# Patient Record
Sex: FEMALE | Race: WHITE | NOT HISPANIC OR LATINO | Employment: UNEMPLOYED | URBAN - METROPOLITAN AREA
[De-identification: names, ages, dates, MRNs, and addresses within clinical notes are randomized per-mention and may not be internally consistent; named-entity substitution may affect disease eponyms.]

---

## 2022-11-04 ENCOUNTER — OFFICE VISIT (OUTPATIENT)
Dept: URGENT CARE | Facility: CLINIC | Age: 22
End: 2022-11-04

## 2022-11-04 VITALS
BODY MASS INDEX: 38.14 KG/M2 | RESPIRATION RATE: 16 BRPM | HEIGHT: 67 IN | TEMPERATURE: 99 F | DIASTOLIC BLOOD PRESSURE: 77 MMHG | HEART RATE: 84 BPM | OXYGEN SATURATION: 98 % | SYSTOLIC BLOOD PRESSURE: 111 MMHG | WEIGHT: 243 LBS

## 2022-11-04 DIAGNOSIS — J02.9 SORE THROAT: ICD-10-CM

## 2022-11-04 DIAGNOSIS — J03.00 ACUTE NON-RECURRENT STREPTOCOCCAL TONSILLITIS: Primary | ICD-10-CM

## 2022-11-04 LAB
CTP QC/QA: YES
S PYO RRNA THROAT QL PROBE: POSITIVE

## 2022-11-04 PROCEDURE — 99214 OFFICE O/P EST MOD 30 MIN: CPT | Mod: TIER,S$GLB,, | Performed by: NURSE PRACTITIONER

## 2022-11-04 PROCEDURE — 87880 POCT RAPID STREP A: ICD-10-PCS | Mod: QW,,, | Performed by: NURSE PRACTITIONER

## 2022-11-04 PROCEDURE — 87880 STREP A ASSAY W/OPTIC: CPT | Mod: QW,,, | Performed by: NURSE PRACTITIONER

## 2022-11-04 PROCEDURE — 99214 PR OFFICE/OUTPT VISIT, EST, LEVL IV, 30-39 MIN: ICD-10-PCS | Mod: TIER,S$GLB,, | Performed by: NURSE PRACTITIONER

## 2022-11-04 RX ORDER — AMOXICILLIN 500 MG/1
500 CAPSULE ORAL EVERY 12 HOURS
Qty: 20 CAPSULE | Refills: 0 | Status: SHIPPED | OUTPATIENT
Start: 2022-11-04 | End: 2022-11-14

## 2022-11-04 NOTE — PROGRESS NOTES
"Subjective:       Patient ID: Jesus Nicholson is a 22 y.o. female.    Vitals:  height is 5' 7" (1.702 m) and weight is 110.2 kg (243 lb). Her temperature is 98.9 °F (37.2 °C). Her blood pressure is 111/77 and her pulse is 84. Her respiration is 16 and oxygen saturation is 98%.     Chief Complaint: Sore Throat    Jesus Nicholson presents to clinic with sore throat, trouble swallowing, neck pain that has been present for the last 3 days.    Sore Throat   This is a new problem. The current episode started in the past 7 days (x's 3 days). The problem has been gradually worsening. Associated symptoms include neck pain and trouble swallowing. Associated symptoms comments: Stiffness to neck. She has tried acetaminophen for the symptoms. The treatment provided mild relief.     Constitution: Negative.   HENT:  Positive for sore throat and trouble swallowing.    Neck: Positive for neck pain.   Cardiovascular: Negative.    Eyes: Negative.    Respiratory: Negative.     Gastrointestinal: Negative.    Endocrine: negative.   Genitourinary: Negative.    Skin: Negative.      Objective:      Physical Exam   Constitutional: She is oriented to person, place, and time. She appears well-developed. She is cooperative.  Non-toxic appearance. She appears ill. No distress.   HENT:   Head: Normocephalic and atraumatic.   Ears:   Right Ear: Hearing, external ear and ear canal normal. A middle ear effusion is present.   Left Ear: Hearing, external ear and ear canal normal. A middle ear effusion is present.   Nose: Mucosal edema present. No rhinorrhea or nasal deformity. No epistaxis. Right sinus exhibits no maxillary sinus tenderness and no frontal sinus tenderness. Left sinus exhibits no maxillary sinus tenderness and no frontal sinus tenderness.   Mouth/Throat: Uvula is midline and mucous membranes are normal. No trismus in the jaw. Normal dentition. No uvula swelling. Posterior oropharyngeal erythema and cobblestoning present. No oropharyngeal " exudate or posterior oropharyngeal edema.   Eyes: Conjunctivae and lids are normal. No scleral icterus.   Neck: Trachea normal and phonation normal. Neck supple. No edema present. No erythema present. No neck rigidity present.   Cardiovascular: Normal rate, regular rhythm, normal heart sounds and normal pulses.   Pulmonary/Chest: Effort normal and breath sounds normal. No respiratory distress. She has no decreased breath sounds. She has no rhonchi.   Abdominal: Normal appearance.   Musculoskeletal: Normal range of motion.         General: No deformity. Normal range of motion.   Neurological: She is alert and oriented to person, place, and time. She exhibits normal muscle tone. Coordination normal.   Skin: Skin is warm, dry, intact, not diaphoretic and not pale.   Psychiatric: Her speech is normal and behavior is normal. Judgment and thought content normal.   Nursing note and vitals reviewed.      Assessment:       1. Acute non-recurrent streptococcal tonsillitis    2. Sore throat          Plan:         Acute non-recurrent streptococcal tonsillitis  -     amoxicillin (AMOXIL) 500 MG capsule; Take 1 capsule (500 mg total) by mouth every 12 (twelve) hours. for 10 days  Dispense: 20 capsule; Refill: 0    Sore throat  -     POCT rapid strep A  -     amoxicillin (AMOXIL) 500 MG capsule; Take 1 capsule (500 mg total) by mouth every 12 (twelve) hours. for 10 days  Dispense: 20 capsule; Refill: 0

## 2022-11-04 NOTE — LETTER
November 4, 2022      [facility] [place]  [address]  [place] [address]  Phone: [phone]  Fax: [phone]       Patient: Jesus Nicholson   YOB: 2000  Date of Visit: 11/04/2022    To Whom It May Concern:    Jesu Nicholson  was at [facility] on 11/04/2022. The patient may return to work/school on 11/6/22 with no restrictions. If you have any questions or concerns, or if I can be of further assistance, please do not hesitate to contact me.    Sincerely,    Vera Mcintyre NP

## 2022-12-02 ENCOUNTER — OFFICE VISIT (OUTPATIENT)
Dept: OBSTETRICS AND GYNECOLOGY | Facility: CLINIC | Age: 22
End: 2022-12-02
Payer: MEDICAID

## 2022-12-02 ENCOUNTER — LAB VISIT (OUTPATIENT)
Dept: LAB | Facility: CLINIC | Age: 22
End: 2022-12-02
Payer: MEDICAID

## 2022-12-02 VITALS
SYSTOLIC BLOOD PRESSURE: 116 MMHG | BODY MASS INDEX: 36.93 KG/M2 | HEART RATE: 81 BPM | WEIGHT: 249.31 LBS | DIASTOLIC BLOOD PRESSURE: 64 MMHG | HEIGHT: 69 IN

## 2022-12-02 DIAGNOSIS — Z98.891 HX OF CESAREAN SECTION: ICD-10-CM

## 2022-12-02 DIAGNOSIS — E06.3 HASHIMOTO'S DISEASE: ICD-10-CM

## 2022-12-02 DIAGNOSIS — R11.0 NAUSEA: ICD-10-CM

## 2022-12-02 DIAGNOSIS — O34.219 DESIRES VBAC (VAGINAL BIRTH AFTER CESAREAN) TRIAL: ICD-10-CM

## 2022-12-02 DIAGNOSIS — Z32.01 POSITIVE PREGNANCY TEST: Primary | ICD-10-CM

## 2022-12-02 LAB
T3 SERPL-MCNC: 104 NG/DL (ref 60–180)
T4 FREE SERPL-MCNC: 0.81 NG/DL (ref 0.71–1.51)
TSH SERPL DL<=0.005 MIU/L-ACNC: 8.46 UIU/ML (ref 0.4–4)

## 2022-12-02 PROCEDURE — 3078F DIAST BP <80 MM HG: CPT | Mod: CPTII,S$GLB,,

## 2022-12-02 PROCEDURE — 1159F PR MEDICATION LIST DOCUMENTED IN MEDICAL RECORD: ICD-10-PCS | Mod: CPTII,S$GLB,,

## 2022-12-02 PROCEDURE — 84480 ASSAY TRIIODOTHYRONINE (T3): CPT

## 2022-12-02 PROCEDURE — 3078F PR MOST RECENT DIASTOLIC BLOOD PRESSURE < 80 MM HG: ICD-10-PCS | Mod: CPTII,S$GLB,,

## 2022-12-02 PROCEDURE — 84439 ASSAY OF FREE THYROXINE: CPT

## 2022-12-02 PROCEDURE — 3074F PR MOST RECENT SYSTOLIC BLOOD PRESSURE < 130 MM HG: ICD-10-PCS | Mod: CPTII,S$GLB,,

## 2022-12-02 PROCEDURE — 3008F PR BODY MASS INDEX (BMI) DOCUMENTED: ICD-10-PCS | Mod: CPTII,S$GLB,,

## 2022-12-02 PROCEDURE — 84443 ASSAY THYROID STIM HORMONE: CPT

## 2022-12-02 PROCEDURE — 1159F MED LIST DOCD IN RCRD: CPT | Mod: CPTII,S$GLB,,

## 2022-12-02 PROCEDURE — 36415 PR COLLECTION VENOUS BLOOD,VENIPUNCTURE: ICD-10-PCS | Mod: ,,, | Performed by: STUDENT IN AN ORGANIZED HEALTH CARE EDUCATION/TRAINING PROGRAM

## 2022-12-02 PROCEDURE — 99203 OFFICE O/P NEW LOW 30 MIN: CPT | Mod: TH,S$GLB,,

## 2022-12-02 PROCEDURE — 3008F BODY MASS INDEX DOCD: CPT | Mod: CPTII,S$GLB,,

## 2022-12-02 PROCEDURE — 3074F SYST BP LT 130 MM HG: CPT | Mod: CPTII,S$GLB,,

## 2022-12-02 PROCEDURE — 36415 COLL VENOUS BLD VENIPUNCTURE: CPT | Mod: ,,, | Performed by: STUDENT IN AN ORGANIZED HEALTH CARE EDUCATION/TRAINING PROGRAM

## 2022-12-02 PROCEDURE — 99203 PR OFFICE/OUTPT VISIT, NEW, LEVL III, 30-44 MIN: ICD-10-PCS | Mod: TH,S$GLB,,

## 2022-12-02 RX ORDER — ONDANSETRON 4 MG/1
4 TABLET, ORALLY DISINTEGRATING ORAL EVERY 6 HOURS PRN
Qty: 30 TABLET | Refills: 5 | Status: SHIPPED | OUTPATIENT
Start: 2022-12-02 | End: 2023-03-02

## 2022-12-02 NOTE — PROGRESS NOTES
"2022  22 y.o. 7w5d per LMP of 10/09/2022. UPT in office is positive. Dating u/s ordered. Tentative JUSTIN is 2023.  OB History    Para Term  AB Living   3 1 1   1 1   SAB IAB Ectopic Multiple Live Births   1       1      # Outcome Date GA Lbr Daniel/2nd Weight Sex Delivery Anes PTL Lv   3 Current            2 SAB 2021     SAB      1 Term 2021    F CS-Unspec  N SAURABH      Complications: Breech presentation       Here for scheduled confirmation of pregnancy visit. Doing well.  No lof/brvb, dysuria, fever/chill. + nausea or emesis. No S&S of pre eclampsia or COIVD 19.  No cramps/regular contractions. Calm, pleasant, NAD. ROS negative with exception of aforementioned:    Allergies:   None    PMH:  Hashimoto's disease- not currently on medications. TSH, T4, T3 ordered today.  SAB complete in 2021    Surghx:  C/S 2021 for Breech. Baby Girl weighing 8#2oz. Pt desires .   [place]. aJs Guaman, VIOLETTE completed today for operative note  Tumor removal from Jaw  [place] teeth extraction    SOCHX:  Denies tobacco, substance or etoh use.  She desires NIPT at next appt.     Review of Systems:  General ROS: negative for headache or visual changes  Breast ROS: negative for breast lumps  Gastrointestinal ROS: negative for constipation, diarrhea. + nausea/vomiting  Musculoskeletal ROS: negative for pain in joints or swelling in face or hands.   Neurological ROS: negative for - headaches, numbness/tingling or visual changes      Physical Exam:  /64 (BP Location: Left arm, Patient Position: Sitting)   Pulse 81   Ht 5' 9" (1.753 m)   Wt 113.1 kg (249 lb 4.8 oz)   LMP 10/09/2022   Breastfeeding Unknown   BMI 36.82 kg/m²   UPT: positive  FHT:  not assessed due to early gestation    Constitutional: She is oriented to person, place, and time. She appears well-developed and well-nourished. No distress.   Pulmonary/Chest: Effort normal. No respiratory distress  Abdominal: Soft, " gravid, nontender. No rebound and no guarding. Fundal Height:  not assessed due to early gestation  Genitourinary: Deferred   Musculoskeletal: Normal range of motion. Minimal peripheral edema.   Neurological: She is alert and oriented to person, place, and time. Coordination normal. Gait smooth and steady  Skin: Skin is warm and dry. She is not diaphoretic.  Psychiatric: She has a normal mood and affect.      Assessment:   22 y.o., at Unknown Gestation   Patient Active Problem List   Diagnosis    Hashimoto's disease    Hx of  section    Desires  (vaginal birth after ) trial     No current outpatient medications on file prior to visit.     No current facility-administered medications on file prior to visit.       Plan:  S&S of SAB reinforced.  Continue home meds/daily PNV, antiemetics.  Hx of hashimotos, thyroid panel completed today.  Dating u/s ordered.  Hx of C/S for breech. Operative note requested. Desires .  LAURA in 4 weeks.

## 2022-12-05 DIAGNOSIS — E06.3 HASHIMOTO'S DISEASE: Primary | ICD-10-CM

## 2022-12-05 RX ORDER — LEVOTHYROXINE SODIUM 50 UG/1
50 TABLET ORAL
Qty: 30 TABLET | Refills: 1 | Status: SHIPPED | OUTPATIENT
Start: 2022-12-05 | End: 2023-01-03

## 2022-12-05 NOTE — PROGRESS NOTES
TSH is high. I am starting her on 50 mcg of synthroid. She needs to take this 1st thing in the morning prior to eating. She has to space medication and food by 1 hr and synthroid and PNV 4 hrs. Will recheck TSH in 4 weeks.

## 2022-12-05 NOTE — PROGRESS NOTES
Pt notified of rx for synthroid and TSH results. Pt picked up synthroid this morning and took before breakfast. She verbalizes understanding of instructions for taking medication.

## 2022-12-13 ENCOUNTER — PATIENT MESSAGE (OUTPATIENT)
Dept: OBSTETRICS AND GYNECOLOGY | Facility: CLINIC | Age: 22
End: 2022-12-13

## 2022-12-16 ENCOUNTER — PROCEDURE VISIT (OUTPATIENT)
Dept: MATERNAL FETAL MEDICINE | Facility: CLINIC | Age: 22
End: 2022-12-16
Payer: MEDICAID

## 2022-12-16 DIAGNOSIS — Z32.01 POSITIVE PREGNANCY TEST: ICD-10-CM

## 2022-12-16 PROCEDURE — 76801 OB US < 14 WKS SINGLE FETUS: CPT | Mod: S$GLB,,, | Performed by: OBSTETRICS & GYNECOLOGY

## 2022-12-16 PROCEDURE — 76801 US OB/GYN PROCEDURE (VIEWPOINT): ICD-10-PCS | Mod: S$GLB,,, | Performed by: OBSTETRICS & GYNECOLOGY

## 2022-12-20 DIAGNOSIS — K59.09 OTHER CONSTIPATION: Primary | ICD-10-CM

## 2022-12-20 DIAGNOSIS — K64.9 HEMORRHOIDS, UNSPECIFIED HEMORRHOID TYPE: ICD-10-CM

## 2022-12-20 RX ORDER — POLYETHYLENE GLYCOL 3350 17 G/17G
17 POWDER, FOR SOLUTION ORAL DAILY
Qty: 595 G | Refills: 0 | Status: SHIPPED | OUTPATIENT
Start: 2022-12-20 | End: 2023-01-19

## 2022-12-20 RX ORDER — HYDROCORTISONE 1 %
CREAM (GRAM) TOPICAL
Qty: 30 G | Refills: 1 | Status: SHIPPED | OUTPATIENT
Start: 2022-12-20 | End: 2023-12-20

## 2022-12-28 ENCOUNTER — LAB VISIT (OUTPATIENT)
Dept: LAB | Facility: CLINIC | Age: 22
End: 2022-12-28
Payer: MEDICAID

## 2022-12-28 ENCOUNTER — ROUTINE PRENATAL (OUTPATIENT)
Dept: OBSTETRICS AND GYNECOLOGY | Facility: CLINIC | Age: 22
End: 2022-12-28
Payer: MEDICAID

## 2022-12-28 VITALS
BODY MASS INDEX: 35.9 KG/M2 | HEART RATE: 87 BPM | WEIGHT: 243.13 LBS | SYSTOLIC BLOOD PRESSURE: 112 MMHG | DIASTOLIC BLOOD PRESSURE: 68 MMHG

## 2022-12-28 DIAGNOSIS — K64.9 HEMORRHOIDS, UNSPECIFIED HEMORRHOID TYPE: ICD-10-CM

## 2022-12-28 DIAGNOSIS — Z3A.11 11 WEEKS GESTATION OF PREGNANCY: Primary | ICD-10-CM

## 2022-12-28 DIAGNOSIS — O34.219 DESIRES VBAC (VAGINAL BIRTH AFTER CESAREAN) TRIAL: ICD-10-CM

## 2022-12-28 DIAGNOSIS — Z3A.11 11 WEEKS GESTATION OF PREGNANCY: ICD-10-CM

## 2022-12-28 DIAGNOSIS — Z98.891 HX OF CESAREAN SECTION: ICD-10-CM

## 2022-12-28 DIAGNOSIS — E06.3 HASHIMOTO'S DISEASE: ICD-10-CM

## 2022-12-28 LAB
ABO + RH BLD: NORMAL
BASOPHILS # BLD AUTO: 0.03 K/UL (ref 0–0.2)
BASOPHILS NFR BLD: 0.4 % (ref 0–1.9)
BLD GP AB SCN CELLS X3 SERPL QL: NORMAL
DIFFERENTIAL METHOD: ABNORMAL
EOSINOPHIL # BLD AUTO: 0 K/UL (ref 0–0.5)
EOSINOPHIL NFR BLD: 0.6 % (ref 0–8)
ERYTHROCYTE [DISTWIDTH] IN BLOOD BY AUTOMATED COUNT: 15.1 % (ref 11.5–14.5)
HAV IGM SERPL QL IA: ABNORMAL
HBV CORE IGM SERPL QL IA: ABNORMAL
HBV SURFACE AG SERPL QL IA: ABNORMAL
HCT VFR BLD AUTO: 34.8 % (ref 37–48.5)
HCV AB SERPL QL IA: REACTIVE
HGB BLD-MCNC: 11.6 G/DL (ref 12–16)
HGB S BLD QL SOLY: NEGATIVE
HIV 1+2 AB+HIV1 P24 AG SERPL QL IA: NORMAL
IMM GRANULOCYTES # BLD AUTO: 0.02 K/UL (ref 0–0.04)
IMM GRANULOCYTES NFR BLD AUTO: 0.3 % (ref 0–0.5)
LYMPHOCYTES # BLD AUTO: 1.5 K/UL (ref 1–4.8)
LYMPHOCYTES NFR BLD: 21.3 % (ref 18–48)
MCH RBC QN AUTO: 28.1 PG (ref 27–31)
MCHC RBC AUTO-ENTMCNC: 33.3 G/DL (ref 32–36)
MCV RBC AUTO: 84 FL (ref 82–98)
MONOCYTES # BLD AUTO: 0.4 K/UL (ref 0.3–1)
MONOCYTES NFR BLD: 5.6 % (ref 4–15)
NEUTROPHILS # BLD AUTO: 5 K/UL (ref 1.8–7.7)
NEUTROPHILS NFR BLD: 71.8 % (ref 38–73)
NRBC BLD-RTO: 0 /100 WBC
PLATELET # BLD AUTO: 203 K/UL (ref 150–450)
PMV BLD AUTO: 13.3 FL (ref 9.2–12.9)
RBC # BLD AUTO: 4.13 M/UL (ref 4–5.4)
T3 SERPL-MCNC: 108 NG/DL (ref 60–180)
T4 FREE SERPL-MCNC: 0.97 NG/DL (ref 0.71–1.51)
TSH SERPL DL<=0.005 MIU/L-ACNC: 2.96 UIU/ML (ref 0.4–4)
WBC # BLD AUTO: 6.99 K/UL (ref 3.9–12.7)

## 2022-12-28 PROCEDURE — 36415 COLL VENOUS BLD VENIPUNCTURE: CPT | Mod: ,,, | Performed by: STUDENT IN AN ORGANIZED HEALTH CARE EDUCATION/TRAINING PROGRAM

## 2022-12-28 PROCEDURE — 85025 COMPLETE CBC W/AUTO DIFF WBC: CPT

## 2022-12-28 PROCEDURE — 86592 SYPHILIS TEST NON-TREP QUAL: CPT

## 2022-12-28 PROCEDURE — 84443 ASSAY THYROID STIM HORMONE: CPT

## 2022-12-28 PROCEDURE — 36415 PR COLLECTION VENOUS BLOOD,VENIPUNCTURE: ICD-10-PCS | Mod: ,,, | Performed by: STUDENT IN AN ORGANIZED HEALTH CARE EDUCATION/TRAINING PROGRAM

## 2022-12-28 PROCEDURE — 86762 RUBELLA ANTIBODY: CPT

## 2022-12-28 PROCEDURE — 99214 OFFICE O/P EST MOD 30 MIN: CPT | Mod: TH,S$GLB,,

## 2022-12-28 PROCEDURE — 84439 ASSAY OF FREE THYROXINE: CPT

## 2022-12-28 PROCEDURE — 80074 ACUTE HEPATITIS PANEL: CPT

## 2022-12-28 PROCEDURE — 99214 PR OFFICE/OUTPT VISIT, EST, LEVL IV, 30-39 MIN: ICD-10-PCS | Mod: TH,S$GLB,,

## 2022-12-28 PROCEDURE — 87389 HIV-1 AG W/HIV-1&-2 AB AG IA: CPT

## 2022-12-28 PROCEDURE — 85660 RBC SICKLE CELL TEST: CPT

## 2022-12-28 PROCEDURE — 84480 ASSAY TRIIODOTHYRONINE (T3): CPT

## 2022-12-28 PROCEDURE — 86900 BLOOD TYPING SEROLOGIC ABO: CPT

## 2022-12-28 NOTE — PROGRESS NOTES
2022  22 y.o. 11w5d per u/s on 2022 at 10w0d.  JUSTIN is 2023.  OB History    Para Term  AB Living   3 1 1   1 1   SAB IAB Ectopic Multiple Live Births   1       1      # Outcome Date GA Lbr Daniel/2nd Weight Sex Delivery Anes PTL Lv   3 Current            2 SAB 2021     SAB      1 Term 2021   3.685 kg (8 lb 2 oz) F CS-LTranv  N SAURABH      Complications: Breech presentation       Here for Initial OB visit. Doing well.  No lof/brvb, dysuria, fever/chill, nausea/vomiting. No S&S of pre eclampsia or COIVD 19.  No cramps/regular contractions. Calm, pleasant, NAD. ROS negative with exception of aforementioned:    Operative not for  c/s received and reviewed. LTCS per operative note. Op note scanned to media. Pt desires . Will schedule  consult after 20 weeks EGA. We discussed that [place] [facility] [place]. Pt verbalizes understanding.     Allergies:   None    PMH:  Hashimoto's disease- not currently on medications. Last TSH 8. Started on 50mcg syntrhoid. Thyroid panel repeated today.   SAB complete in 2021    Surghx:  C/S 2021 for Breech. Baby Girl weighing 8#2oz. Pt desires .   [place]. 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  She desires NIPT at next appt.     OBHX:    Hx of C/S for Breech. Desires .  Hashimotos  BMI > 35 at Initial OB    LABS:    Thyroid Panel  TSH 8.445  T4 0.81  T3 104    Review of Systems:  General ROS: negative for headache or visual changes  Breast ROS: negative for breast lumps  Gastrointestinal ROS: negative for constipation, diarrhea. + nausea/vomiting  Musculoskeletal ROS: negative for pain in joints or swelling in face or hands.   Neurological ROS: negative for - headaches, numbness/tingling or visual changes      Physical Exam:  /68   Pulse 87   Wt 110.3 kg (243 lb 1.6 oz)   LMP  10/09/2022   BMI 35.90 kg/m²   Urine Dip: neg/neg  FHT: Fetal Heart Rate: 160    Constitutional: She is oriented to person, place, and time. She appears well-developed and well-nourished. No distress.   Pulmonary/Chest: Effort normal. No respiratory distress  Abdominal: Soft, gravid, nontender. No rebound and no guarding. Fundal Height: Fundal Height (cm): 11 cm S=D  Genitourinary: Deferred   Musculoskeletal: Normal range of motion. Minimal peripheral edema.   Neurological: She is alert and oriented to person, place, and time. Coordination normal. Gait smooth and steady  Skin: Skin is warm and dry. She is not diaphoretic.  Psychiatric: She has a normal mood and affect.      Assessment:   22 y.o., at 11w5d  Patient Active Problem List   Diagnosis    Hashimoto's disease    Hx of  section    Desires  (vaginal birth after ) trial    11 weeks gestation of pregnancy     Current Outpatient Medications on File Prior to Visit   Medication Sig Dispense Refill    hydrocortisone 1 % cream Apply to affected area 2 times daily 30 g 1    levothyroxine (SYNTHROID) 50 MCG tablet Take 1 tablet (50 mcg total) by mouth before breakfast. 30 tablet 1    ondansetron (ZOFRAN-ODT) 4 MG TbDL Take 1 tablet (4 mg total) by mouth every 6 (six) hours as needed (nausea). 30 tablet 5    polyethylene glycol (GLYCOLAX) 17 gram/dose powder Take 17 g by mouth once daily. 595 g 0     No current facility-administered medications on file prior to visit.       Plan:  Oriented to our collaborative group.   S&S of SAB reinforced.  Continue home meds/daily PNV, antiemetics, synthroid.  TSH 8.445 last visit. Started on 50mcg synthoid. Thyroid panel repeated today.  JUSTIN 2023 per u/s at 10w0d.  Hx of C/S for breech. Operative note states LTCS. Desires .  OB panel + Thyroid panel + NIPT today.  LAURA in 4 weeks.

## 2022-12-29 LAB
RPR SER QL: NORMAL
RUBV IGG SER-ACNC: 14.4 IU/ML
RUBV IGG SER-IMP: REACTIVE

## 2022-12-30 DIAGNOSIS — R76.8 HEPATITIS C ANTIBODY POSITIVE IN BLOOD: Primary | ICD-10-CM

## 2022-12-30 NOTE — PROGRESS NOTES
+ hep c ab reviewed with patient via phone call. Pt is not sure how she may have come into contact with hep c virus. HCV qualitative and quantitative testing recommended. Pt states she can come in on Monday for testing.

## 2023-01-03 ENCOUNTER — LAB VISIT (OUTPATIENT)
Dept: LAB | Facility: CLINIC | Age: 23
End: 2023-01-03
Payer: MEDICAID

## 2023-01-03 DIAGNOSIS — R76.8 HEPATITIS C ANTIBODY POSITIVE IN BLOOD: ICD-10-CM

## 2023-01-03 PROCEDURE — 87522 HEPATITIS C REVRS TRNSCRPJ: CPT | Mod: 59

## 2023-01-03 PROCEDURE — 36415 COLL VENOUS BLD VENIPUNCTURE: CPT | Mod: ,,, | Performed by: STUDENT IN AN ORGANIZED HEALTH CARE EDUCATION/TRAINING PROGRAM

## 2023-01-03 PROCEDURE — 87521 HEPATITIS C PROBE&RVRS TRNSC: CPT

## 2023-01-03 PROCEDURE — 36415 PR COLLECTION VENOUS BLOOD,VENIPUNCTURE: ICD-10-PCS | Mod: ,,, | Performed by: STUDENT IN AN ORGANIZED HEALTH CARE EDUCATION/TRAINING PROGRAM

## 2023-01-04 LAB
HCV RNA SERPL QL NAA+PROBE: NOT DETECTED
HCV RNA SPEC NAA+PROBE-ACNC: <12 IU/ML

## 2023-01-10 LAB — HCV RNA SERPL QL NAA+PROBE: NOT DETECTED

## 2023-01-25 ENCOUNTER — LAB VISIT (OUTPATIENT)
Dept: LAB | Facility: CLINIC | Age: 23
End: 2023-01-25
Payer: MEDICAID

## 2023-01-25 ENCOUNTER — ROUTINE PRENATAL (OUTPATIENT)
Dept: OBSTETRICS AND GYNECOLOGY | Facility: CLINIC | Age: 23
End: 2023-01-25
Payer: MEDICAID

## 2023-01-25 VITALS
HEART RATE: 94 BPM | DIASTOLIC BLOOD PRESSURE: 76 MMHG | SYSTOLIC BLOOD PRESSURE: 118 MMHG | WEIGHT: 244 LBS | BODY MASS INDEX: 36.03 KG/M2

## 2023-01-25 DIAGNOSIS — Z98.891 HX OF CESAREAN SECTION: ICD-10-CM

## 2023-01-25 DIAGNOSIS — R76.8 HEPATITIS C ANTIBODY POSITIVE IN BLOOD: ICD-10-CM

## 2023-01-25 DIAGNOSIS — Z3A.15 15 WEEKS GESTATION OF PREGNANCY: ICD-10-CM

## 2023-01-25 DIAGNOSIS — E06.3 HASHIMOTO'S DISEASE: Primary | ICD-10-CM

## 2023-01-25 DIAGNOSIS — O34.219 DESIRES VBAC (VAGINAL BIRTH AFTER CESAREAN) TRIAL: ICD-10-CM

## 2023-01-25 DIAGNOSIS — E06.3 HASHIMOTO'S DISEASE: ICD-10-CM

## 2023-01-25 DIAGNOSIS — E66.9 OBESITY (BMI 35.0-39.9 WITHOUT COMORBIDITY): ICD-10-CM

## 2023-01-25 PROBLEM — Z3A.11 11 WEEKS GESTATION OF PREGNANCY: Status: RESOLVED | Noted: 2022-12-28 | Resolved: 2023-01-25

## 2023-01-25 PROCEDURE — 99213 PR OFFICE/OUTPT VISIT, EST, LEVL III, 20-29 MIN: ICD-10-PCS | Mod: TH,S$GLB,, | Performed by: ADVANCED PRACTICE MIDWIFE

## 2023-01-25 PROCEDURE — 99213 OFFICE O/P EST LOW 20 MIN: CPT | Mod: TH,S$GLB,, | Performed by: ADVANCED PRACTICE MIDWIFE

## 2023-01-25 PROCEDURE — 84480 ASSAY TRIIODOTHYRONINE (T3): CPT | Performed by: ADVANCED PRACTICE MIDWIFE

## 2023-01-25 PROCEDURE — 80307 DRUG TEST PRSMV CHEM ANLYZR: CPT | Performed by: ADVANCED PRACTICE MIDWIFE

## 2023-01-25 PROCEDURE — 82105 ALPHA-FETOPROTEIN SERUM: CPT | Performed by: ADVANCED PRACTICE MIDWIFE

## 2023-01-25 NOTE — PROGRESS NOTES
2023  22 y.o.  at 15 + 5d per u/s on 2022 at 10w0d.  JUSTIN is 2023.  OB History    Para Term  AB Living   3 1 1   1 1   SAB IAB Ectopic Multiple Live Births   1       1      # Outcome Date GA Lbr Daniel/2nd Weight Sex Delivery Anes PTL Lv   3 Current            2 SAB 2021     SAB      1 Term 2021   3.685 kg (8 lb 2 oz) F CS-LTranv  N SAURABH      Complications: Breech presentation       Here for Initial OB visit. Doing well.  No lof/brvb, dysuria, fever/chils, N&V. No abdominal pain/cramps. No quickening as of yet. Calm, pleasant, NAD. ROS negative with exception of aforementioned:    Operative not for  c/s received and reviewed. LTCS per operative note (breech). Op note scanned to media. Pt desires . Will schedule  consult after 20 weeks EGA.  discussed that VBACs are done at [facility] with physician. Pt verbalizes understanding.   + Hep ab with neg quant and qual- plan repeat with 28 week labs.     LABS:  A pos abs neg  HIV neg  Rubella immune  RPR non reactive  HEP A B neg  + Hep C ab with neg quant and qual  UDS pending  GC CHL pending  Urine cx pending  NIPT low risk x 3 female  CBC    Sickle Screen neg  AFP pending    Allergies:   None    PMH:  Hashimoto's disease- not currently on medications. Last TSH 8. Started on 50mcg syntrhoid. Thyroid panel repeated and decreased to 2. Repeat today.  SAB complete in 2021    Surghx:  C/S 2021 for Breech. Baby Girl weighing 8#2oz. Pt desires .   [place]. 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  She desires NIPT at next appt.     OBHX:    Hx of C/S for Breech. Desires .  Hashimotos  BMI > 35 at Initial OB    LABS:    Thyroid Panel  TSH 8.445  T4 0.81  T3 104    Review of Systems:  General ROS: negative for headache or visual changes  Breast ROS: negative for breast  lumps  Gastrointestinal ROS: negative for constipation, diarrhea. + nausea/vomiting  Musculoskeletal ROS: negative for pain in joints or swelling in face or hands.   Neurological ROS: negative for - headaches, numbness/tingling or visual changes      Physical Exam:  /76   Pulse 94   Wt 110.7 kg (244 lb)   LMP 10/09/2022   BMI 36.03 kg/m²   FHT:  150s    Constitutional: She is oriented to person, place, and time. She appears well-developed and well-nourished. No distress.   Pulmonary/Chest: Effort normal. No respiratory distress  Abdominal: Soft, gravid, nontender. No rebound and no guarding. Fundal Height:   S=D  Genitourinary: Deferred   Musculoskeletal: Normal range of motion. Minimal peripheral edema.   Neurological: She is alert and oriented to person, place, and time. Coordination normal. Gait smooth and steady  Skin: Skin is warm and dry. She is not diaphoretic.  Psychiatric: She has a normal mood and affect.      Assessment:   22 y.o., at 15 w5d  Patient Active Problem List   Diagnosis    Hashimoto's disease    Hx of  section    Desires  (vaginal birth after ) trial    15 weeks gestation of pregnancy    Hepatitis C antibody positive in blood    Obesity (BMI 35.0-39.9 without comorbidity)     Current Outpatient Medications on File Prior to Visit   Medication Sig Dispense Refill    hydrocortisone 1 % cream Apply to affected area 2 times daily 30 g 1    levothyroxine (SYNTHROID) 50 MCG tablet TAKE 1 TABLET(50 MCG) BY MOUTH BEFORE BREAKFAST 30 tablet 1    ondansetron (ZOFRAN-ODT) 4 MG TbDL Take 1 tablet (4 mg total) by mouth every 6 (six) hours as needed (nausea). 30 tablet 5     No current facility-administered medications on file prior to visit.       Plan:  1. Oriented to our collaborative group.   2. S&S of SAB reinforced.  3. Continue home meds/daily PNV, antiemetics, synthroid.  4. JUSTIN 2023 per u/s at 10w0d.  5. Hx of C/S for breech. Operative note states LTCS.  Desires . Plan MD consult later in pregnancy  6. OB panel labs reviewed and discused. AFP, TSH, T3, T4 today. UDS. Urine cx and GC CHL as not done previously.  7. Plan repeat HEP Panel at 28 weeks due to + Hep C ab with neg quant and qual.  8. Anatomy US and LAURA in 4 weeks. US ordered.   9. F/U with provider of choice.

## 2023-01-26 LAB
AMPHET+METHAMPHET UR QL: NEGATIVE
BARBITURATES UR QL SCN>200 NG/ML: NEGATIVE
BENZODIAZ UR QL SCN>200 NG/ML: NEGATIVE
BZE UR QL SCN: NEGATIVE
CANNABINOIDS UR QL SCN: NEGATIVE
CREAT UR-MCNC: 220.7 MG/DL (ref 15–325)
METHADONE UR QL SCN>300 NG/ML: NEGATIVE
OPIATES UR QL SCN: NEGATIVE
PCP UR QL SCN>25 NG/ML: NEGATIVE
T3 SERPL-MCNC: 137 NG/DL (ref 60–180)
T4 FREE SERPL-MCNC: 0.92 NG/DL (ref 0.71–1.51)
TOXICOLOGY INFORMATION: NORMAL
TSH SERPL DL<=0.005 MIU/L-ACNC: 2.46 UIU/ML (ref 0.4–4)

## 2023-01-26 PROCEDURE — 36415 COLL VENOUS BLD VENIPUNCTURE: CPT | Mod: ,,, | Performed by: STUDENT IN AN ORGANIZED HEALTH CARE EDUCATION/TRAINING PROGRAM

## 2023-01-26 PROCEDURE — 36415 PR COLLECTION VENOUS BLOOD,VENIPUNCTURE: ICD-10-PCS | Mod: ,,, | Performed by: STUDENT IN AN ORGANIZED HEALTH CARE EDUCATION/TRAINING PROGRAM

## 2023-01-27 ENCOUNTER — PATIENT MESSAGE (OUTPATIENT)
Dept: OTHER | Facility: OTHER | Age: 23
End: 2023-01-27
Payer: MEDICAID

## 2023-01-27 LAB — BACTERIA UR CULT: NORMAL

## 2023-01-29 LAB
C TRACH DNA SPEC QL NAA+PROBE: NOT DETECTED
N GONORRHOEA DNA SPEC QL NAA+PROBE: NOT DETECTED

## 2023-01-30 LAB
# FETUSES US: NORMAL
AFP INTERPRETATION: NORMAL
AFP MOM SERPL: 0.8
AFP SERPL-MCNC: 20.1 NG/ML
AFP SERPL-MCNC: NEGATIVE NG/ML
AGE AT DELIVERY: 22
GA (DAYS): 6 D
GA (WEEKS): 15 WK
GESTATIONAL AGE METHOD: NORMAL
IDDM PATIENT QL: NORMAL
SMOKING STATUS FTND: NO

## 2023-02-03 ENCOUNTER — PATIENT MESSAGE (OUTPATIENT)
Dept: OTHER | Facility: OTHER | Age: 23
End: 2023-02-03
Payer: MEDICAID

## 2023-02-22 ENCOUNTER — PROCEDURE VISIT (OUTPATIENT)
Dept: MATERNAL FETAL MEDICINE | Facility: CLINIC | Age: 23
End: 2023-02-22
Payer: MEDICAID

## 2023-02-22 ENCOUNTER — ROUTINE PRENATAL (OUTPATIENT)
Dept: OBSTETRICS AND GYNECOLOGY | Facility: CLINIC | Age: 23
End: 2023-02-22
Payer: MEDICAID

## 2023-02-22 VITALS
BODY MASS INDEX: 36.67 KG/M2 | WEIGHT: 248.31 LBS | HEART RATE: 80 BPM | DIASTOLIC BLOOD PRESSURE: 70 MMHG | SYSTOLIC BLOOD PRESSURE: 109 MMHG

## 2023-02-22 DIAGNOSIS — Z3A.19 19 WEEKS GESTATION OF PREGNANCY: Primary | ICD-10-CM

## 2023-02-22 DIAGNOSIS — R76.8 HEPATITIS C ANTIBODY POSITIVE IN BLOOD: ICD-10-CM

## 2023-02-22 DIAGNOSIS — Z98.891 HX OF CESAREAN SECTION: ICD-10-CM

## 2023-02-22 DIAGNOSIS — O34.219 DESIRES VBAC (VAGINAL BIRTH AFTER CESAREAN) TRIAL: ICD-10-CM

## 2023-02-22 DIAGNOSIS — E66.9 OBESITY (BMI 35.0-39.9 WITHOUT COMORBIDITY): ICD-10-CM

## 2023-02-22 DIAGNOSIS — Z3A.15 15 WEEKS GESTATION OF PREGNANCY: ICD-10-CM

## 2023-02-22 PROCEDURE — 76811 OB US DETAILED SNGL FETUS: CPT | Mod: S$GLB,,, | Performed by: OBSTETRICS & GYNECOLOGY

## 2023-02-22 PROCEDURE — 99214 OFFICE O/P EST MOD 30 MIN: CPT | Mod: TH,S$GLB,, | Performed by: ADVANCED PRACTICE MIDWIFE

## 2023-02-22 PROCEDURE — 99214 PR OFFICE/OUTPT VISIT, EST, LEVL IV, 30-39 MIN: ICD-10-PCS | Mod: TH,S$GLB,, | Performed by: ADVANCED PRACTICE MIDWIFE

## 2023-02-22 PROCEDURE — 76811 US OB/GYN PROCEDURE (VIEWPOINT): ICD-10-PCS | Mod: S$GLB,,, | Performed by: OBSTETRICS & GYNECOLOGY

## 2023-02-22 NOTE — PROGRESS NOTES
2023  22 y.o.  at 19 + 5d per u/s on 2022 at 10w0d.  JUSTIN is 2023.  OB History    Para Term  AB Living   3 1 1   1 1   SAB IAB Ectopic Multiple Live Births   1       1      # Outcome Date GA Lbr Daniel/2nd Weight Sex Delivery Anes PTL Lv   3 Current            2 SAB 2021     SAB      1 Term 2021   3.685 kg (8 lb 2 oz) F CS-LTranv  N SAURABH      Complications: Breech presentation       Here for Initial OB visit. Doing well.  No lof/brvb, dysuria, fever/chils, N&V. No abdominal pain/cramps. + quickening reported. Rare nausea. Calm, pleasant, NAD. ROS negative with exception of aforementioned:    Operative not for  c/s received and reviewed. LTCS per operative note (breech). Op note scanned to media. Pt desires . Will schedule  consult after 20 weeks EGA.  discussed that VBACs are done at [facility] with physician. Pt verbalizes understanding.   + Hep ab with neg quant and qual- plan repeat with 28 week labs. Hashimoto's disease-  TSH 8. Started on 50mcg syntrhoid. Thyroid panel repeated and decreased to 2.9 Repeat TSH on 23 = 2.46. wnl      LABS:  A pos abs neg  HIV neg  Rubella immune  RPR non reactive  HEP A B neg  + Hep C ab with neg quant and qual  UDS neg  GC CHL  neg  Urine cx no growth  NIPT low risk x 3 female  CBC    Sickle Screen neg  AFP neg    Allergies:   None    PMH:  Hashimoto's disease- TSH 8. Started on 50mcg syntrhoid. Thyroid panel repeated and decreased to 2.9 Repeat TSHon 23 = 2.46. wnl  SAB complete in 2021    Surghx:  C/S 2021 for Breech. Baby Girl weighing 8#2oz. Pt desires .   [place]. Jas Guaman, VIOLETTE completed today for operative note  Tumor removal from Jaw  [place] teeth extraction    SOCHX:  Denies tobacco, substance or etoh use.  She desires NIPT at next appt.     OBHX:    Hx of C/S for Breech. Desires .  Hashimotos  BMI > 35 at Initial OB    LABS:    Thyroid Panel  TSH 8.445  T4  0.81  T3 104  See Repeat Thyroid labs    Review of Systems:  General ROS: negative for headache or visual changes  Breast ROS: negative for breast lumps  Gastrointestinal ROS: negative for constipation, diarrhea.  Musculoskeletal ROS: negative for pain in joints or swelling in face or hands.   Neurological ROS: negative for - headaches, numbness/tingling or visual changes      Physical Exam:  /70   Pulse 80   Wt 112.6 kg (248 lb 4.8 oz)   LMP 10/09/2022   BMI 36.67 kg/m²   FHT: Fetal Heart Rate: 150s    Constitutional: She is oriented to person, place, and time. She appears well-developed and well-nourished. No distress.   Pulmonary/Chest: Effort normal. No respiratory distress  Abdominal: Soft, gravid, nontender. No rebound and no guarding. Fundal Height: Fundal Height (cm): 20 cm S=D at U  Genitourinary: Deferred   Musculoskeletal: Normal range of motion. Minimal peripheral edema.   Neurological: She is alert and oriented to person, place, and time. Coordination normal. Gait smooth and steady  Skin: Skin is warm and dry. She is not diaphoretic.  Psychiatric: She has a normal mood and affect.      Assessment:   22 y.o., at 19 w5d  Patient Active Problem List   Diagnosis    Hashimoto's disease    Hx of  section    Desires  (vaginal birth after ) trial    Hepatitis C antibody positive in blood    Obesity (BMI 35.0-39.9 without comorbidity)    19 weeks gestation of pregnancy     Current Outpatient Medications on File Prior to Visit   Medication Sig Dispense Refill    levothyroxine (SYNTHROID) 50 MCG tablet TAKE 1 TABLET(50 MCG) BY MOUTH BEFORE BREAKFAST 30 tablet 1    hydrocortisone 1 % cream Apply to affected area 2 times daily (Patient not taking: Reported on 2023) 30 g 1    ondansetron (ZOFRAN-ODT) 4 MG TbDL Take 1 tablet (4 mg total) by mouth every 6 (six) hours as needed (nausea). (Patient not taking: Reported on 2023) 30 tablet 5     No current  facility-administered medications on file prior to visit.       Plan:  1. Oriented to our collaborative group.   2. S&S of SAB, PTL/PPROM reinforced.  3. Continue home meds/daily PNV,  Prn antiemetics, synthroid.  4. JUSTIN 2023 per u/s at 10w0d.  5. Hx of C/S for breech. Operative note states LTCS. Desires . Plan MD consult later in pregnancy  6.  AFP, TSH, T3, T4 today. UDS. Urine cx and GC CHL done at last visit discussed.   7. Plan repeat HEP Panel at 28 weeks due to + Hep C ab with neg quant and qual.  Anatomy US today at 19 + 5/7 weeks reviewed and discussed. Incomplete visualization of some structures. Probable subchorionic hemorrhage/sinus at superior/anterior placental edge. Repeat anatomy US in 4-6 weeks as per MFM discussed and ordered.  8. LAURA in 3 weeks. Discussed transfer to Dr. Thomas after next LAURA as desires [facility] and will need to deliver at .

## 2023-02-24 ENCOUNTER — PATIENT MESSAGE (OUTPATIENT)
Dept: OTHER | Facility: OTHER | Age: 23
End: 2023-02-24
Payer: MEDICAID

## 2023-03-23 ENCOUNTER — ROUTINE PRENATAL (OUTPATIENT)
Dept: OBSTETRICS AND GYNECOLOGY | Facility: CLINIC | Age: 23
End: 2023-03-23
Payer: MEDICAID

## 2023-03-23 VITALS — SYSTOLIC BLOOD PRESSURE: 115 MMHG | WEIGHT: 245 LBS | DIASTOLIC BLOOD PRESSURE: 59 MMHG | BODY MASS INDEX: 36.18 KG/M2

## 2023-03-23 DIAGNOSIS — R76.8 HEPATITIS C ANTIBODY POSITIVE IN BLOOD: ICD-10-CM

## 2023-03-23 DIAGNOSIS — E06.3 HASHIMOTO'S DISEASE: ICD-10-CM

## 2023-03-23 DIAGNOSIS — Z98.891 HX OF CESAREAN SECTION: ICD-10-CM

## 2023-03-23 DIAGNOSIS — E66.9 OBESITY (BMI 35.0-39.9 WITHOUT COMORBIDITY): ICD-10-CM

## 2023-03-23 DIAGNOSIS — O34.219 DESIRES VBAC (VAGINAL BIRTH AFTER CESAREAN) TRIAL: ICD-10-CM

## 2023-03-23 DIAGNOSIS — Z3A.23 23 WEEKS GESTATION OF PREGNANCY: Primary | ICD-10-CM

## 2023-03-23 PROCEDURE — 59425 ANTEPARTUM CARE ONLY: CPT | Mod: TH,S$GLB,, | Performed by: ADVANCED PRACTICE MIDWIFE

## 2023-03-23 PROCEDURE — 59425 PR ANTEPARTUM CARE ONLY, 4-6 VISITS: ICD-10-PCS | Mod: TH,S$GLB,, | Performed by: ADVANCED PRACTICE MIDWIFE

## 2023-03-23 NOTE — PROGRESS NOTES
3/23/2023  22 y.o.  at 23 + 6/7 per u/s on 2022 at 10w0d.  JUSTIN is 2023.  OB History    Para Term  AB Living   3 1 1   1 1   SAB IAB Ectopic Multiple Live Births   1       1      # Outcome Date GA Lbr Daniel/2nd Weight Sex Delivery Anes PTL Lv   3 Current            2 SAB 2021     SAB      1 Term 2021   3.685 kg (8 lb 2 oz) F CS-LTranv  N SAURABH      Complications: Breech presentation       Here for Initial OB visit. Doing well.  No lof/brvb, dysuria, fever/chils, N&V. No abdominal pain/cramps. Good FM reported. Rare nausea. Calm, pleasant, NAD. ROS negative with exception of aforementioned:    Operative not for  c/s received and reviewed. LTCS per operative note (breech). Op note scanned to media. discussed that VBACs are done at [facility] with physician. Pt verbalizes understanding.   + Hep ab with neg quant and qual- plan repeat with 28 week labs. Hashimoto's disease-  TSH 8. Started on 50mcg syntrhoid. Thyroid panel repeated and decreased to 2.9 Repeat TSH on 23 = 2.46. wnl      LABS:  A pos abs neg  HIV neg  Rubella immune  RPR non reactive  HEP A B neg  + Hep C ab with neg quant and qual  UDS neg  GC CHL  neg  Urine cx no growth  NIPT low risk x 3 female  CBC    Sickle Screen neg  AFP neg    Allergies:   None    PMH:  Hashimoto's disease- TSH 8. Started on 50mcg syntrhoid. Thyroid panel repeated and decreased to 2.9 Repeat TSHon 23 = 2.46. wnl  SAB complete in 2021    Surghx:  C/S 2021 for Breech. Baby Girl weighing 8 #2oz. Pt desires .   C/S done in  [place] with Dr. Jas Guaman, VIOLETTE completed and Op report reviewed. Confirms LTCS for breech.  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  She desires NIPT at next appt.     OBHX:    Hx of C/S for Breech. Desires .  Hashimotos  BMI > 35 at Initial OB    LABS:    Thyroid Panel  TSH 8.445  T4 0.81  T3 104  See Repeat Thyroid  labs    Review of Systems:  General ROS: negative for headache or visual changes  Breast ROS: negative for breast lumps  Gastrointestinal ROS: negative for constipation, diarrhea.  Musculoskeletal ROS: negative for pain in joints or swelling in face or hands.   Neurological ROS: negative for - headaches, numbness/tingling or visual changes      Physical Exam:  BP (!) 115/59   Wt 111.1 kg (245 lb)   LMP 10/09/2022   BMI 36.18 kg/m²   FHT:  150s    Constitutional: She is oriented to person, place, and time. She appears well-developed and well-nourished. No distress.   Pulmonary/Chest: Effort normal. No respiratory distress  Abdominal: Soft, gravid, nontender. No rebound and no guarding. Fundal Height:   S=D 24 cm  Genitourinary: Deferred   Musculoskeletal: Normal range of motion. Minimal peripheral edema.   Neurological: She is alert and oriented to person, place, and time. Coordination normal. Gait smooth and steady  Skin: Skin is warm and dry. She is not diaphoretic.  Psychiatric: She has a normal mood and affect.      Assessment:   22 y.o., at 23 + 6/7 d  Patient Active Problem List   Diagnosis    Hashimoto's disease    Hx of  section    Desires  (vaginal birth after ) trial    Hepatitis C antibody positive in blood    Obesity (BMI 35.0-39.9 without comorbidity)    19 weeks gestation of pregnancy    23 weeks gestation of pregnancy     Current Outpatient Medications on File Prior to Visit   Medication Sig Dispense Refill    hydrocortisone 1 % cream Apply to affected area 2 times daily (Patient not taking: Reported on 2023) 30 g 1    levothyroxine (SYNTHROID) 50 MCG tablet TAKE 1 TABLET(50 MCG) BY MOUTH BEFORE BREAKFAST 30 tablet 1     No current facility-administered medications on file prior to visit.       Plan:  1. S&S of PTL/PPROM reinforced.  2. Continue home meds/daily PNV,  Prn antiemetics, synthroid.  3. JUSTIN 2023 per u/s at 10w0d.  4. Hx of C/S for breech. Operative  note states LTCS. Desires . Plan MD consult later in pregnancy  5.  Plan repeat HEP Panel at 28 weeks due to history of + Hep C ab with neg quant and qual.  6. Anatomy US  at 19 + 5/7 weeks reviewed and discussed. Incomplete visualization of some structures. Probable subchorionic hemorrhage/sinus at superior/anterior placental edge. Repeat anatomy US in 4-6 weeks as per [facility] scheduled for 3/31/23.  7. Informs me she is moving out of [facility] to [place] in 1 week. Plans to see OB from prior pregnancy. She has contacted them already. Release of records completed today. She will f/u with them in 2 weeks. Aware of recommendation to repeat Thyroid labs as well as Hep C quant and qual plus need for 1 hour gtt/CBC. She will do with new provider.  8. Travel precautions discussed.  9. BEST WISHES!

## 2023-03-24 ENCOUNTER — PATIENT MESSAGE (OUTPATIENT)
Dept: OTHER | Facility: OTHER | Age: 23
End: 2023-03-24
Payer: MEDICAID

## 2023-04-07 ENCOUNTER — PATIENT MESSAGE (OUTPATIENT)
Dept: OTHER | Facility: OTHER | Age: 23
End: 2023-04-07
Payer: MEDICAID

## 2023-04-21 ENCOUNTER — PATIENT MESSAGE (OUTPATIENT)
Dept: OTHER | Facility: OTHER | Age: 23
End: 2023-04-21
Payer: MEDICAID

## 2023-05-05 ENCOUNTER — PATIENT MESSAGE (OUTPATIENT)
Dept: OTHER | Facility: OTHER | Age: 23
End: 2023-05-05

## 2023-05-19 ENCOUNTER — PATIENT MESSAGE (OUTPATIENT)
Dept: OTHER | Facility: OTHER | Age: 23
End: 2023-05-19

## 2023-06-09 ENCOUNTER — PATIENT MESSAGE (OUTPATIENT)
Dept: OTHER | Facility: OTHER | Age: 23
End: 2023-06-09

## 2024-01-29 PROBLEM — Z3A.19 19 WEEKS GESTATION OF PREGNANCY: Status: RESOLVED | Noted: 2023-02-22 | Resolved: 2024-01-29

## 2024-03-04 PROBLEM — Z3A.23 23 WEEKS GESTATION OF PREGNANCY: Status: RESOLVED | Noted: 2023-03-23 | Resolved: 2024-03-04
